# Patient Record
(demographics unavailable — no encounter records)

---

## 2024-11-14 NOTE — PLAN
[FreeTextEntry1] : Routine Gyn Exam: GynHx: fibroid uterus, HPV in 20s w/ subsequent normal Paps BSE taught. Pap smear due 10/25 (last 10/24, reportedly negative) Mammogram and breast sonogram due 2025 (last summer 2024, reportedly negative) Advised pt. to schedule colonoscopy at 46 y/o  Fibroid Uterus: Rx given for kidney sono to ensure fibroid not compressing it D/w pt R/B medical management w/ OCP vs. Lysteda to reduce bleeding. Pt opts for Lysteda. Rx given for Lysteda, 2 tabs 3 times a day x 5days. If no response in a month or two, will switch to OCPs.  Reviewed surgical options to include lap myomectomy vs. hyst b/l salpingectomy. Pt will consider these surgical options in the meantime. Minimally invasive specialist referral given for consult. Pt will likely move to hysterectomy and BS- referred to dr sifuentes  RTO in 1 year or PRN.

## 2024-11-14 NOTE — SIGNATURES
[TextEntry] : This note was authored by Glo London working as a scribe for Dr. Geraldine Parekh.   I, Dr. Geraldine Parekh, have reviewed the content of this note and confirm it is true and accurate. I personally performed the history and physical examination and made all the decisions. 11/14/2024

## 2024-11-14 NOTE — HISTORY OF PRESENT ILLNESS
[Patient reported PAP Smear was normal] : Patient reported PAP Smear was normal [FreeTextEntry1] : 2024. [facility] 44-year-old female  LMP 10/25/24. She presents to establish GYN care, and evaluate large fibroid  Patient w/ known fibroid uterus. She reports heavy regular menses x max 6d and abdominal bloating. LMP was longer than usual, lasting 6 days in duration. Denies associated weakness and dizziness. Menstrual cycles are typically 28 days. As per pt, screening laboratories drawn in May showed no evidence of anemia.   Pt's  w/ vasectomy.   She denies abdominal and pelvic pain. No abnormal vaginal discharge or vaginitis symptoms. She reports normal urination, no dysuria, no incontinence of urine. BM is normal per patient, no blood in stool or constipation/diarrhea.  Pt's children are now 6, 8, and 10 y/o.  PHQ9: 2.  GynHx: fibroid uterus, HPV in 20s w/ subsequent normal Paps. No hx of STI, ovarian cyst, endometriosis, breast issues.  MedHx: denies Meds: denies OBHx:  SHx: benign skin bx No FHx of breast, ovarian, uterine, or colon cancer. All: NKDA SocHx: no toxic habits [TextBox_4] : Pelvic MRI 10/23: uterus 16cm, lateral intramural fibroid L fundal w/ submucosal and subserosal component measuring 11cm Pelvic sono 10/12/24: uterus 17cm, dominant 11.4 cm L sided, intramural and subserosal fibroid [PapSmeardate] : 10/24

## 2024-11-14 NOTE — PHYSICAL EXAM
[Chaperone Present] : A chaperone was present in the examining room during all aspects of the physical examination [[id number]] : A chaperone was present during the pelvic exam. [Appropriately responsive] : appropriately responsive [Alert] : alert [No Acute Distress] : no acute distress [No Lymphadenopathy] : no lymphadenopathy [Regular Rate Rhythm] : regular rate rhythm [No Murmurs] : no murmurs [Clear to Auscultation B/L] : clear to auscultation bilaterally [Soft] : soft [Non-tender] : non-tender [Non-distended] : non-distended [No HSM] : No HSM [No Lesions] : no lesions [No Mass] : no mass [Oriented x3] : oriented x3 [Examination Of The Breasts] : a normal appearance [No Masses] : no breast masses were palpable [Labia Majora] : normal [Labia Minora] : normal [Normal] : normal [Enlarged ___ wks] : enlarged [unfilled] ~Uweeks [Uterine Adnexae] : normal [FreeTextEntry2] : Glo [place] [FreeTextEntry6] : 22-wk size uterus. mobile [FreeTextEntry9] : Guaiac negative, no masses

## 2024-12-08 NOTE — REVIEW OF SYSTEMS
[Abdominal Pain] : abdominal pain [Urgency] : urgency [Abn Vaginal bleeding] : abnormal vaginal bleeding [Negative] : Psychiatric

## 2024-12-08 NOTE — PHYSICAL EXAM
[Chaperone Present] : A chaperone was present in the examining room during all aspects of the physical examination [FreeTextEntry2] : Sandra [Appropriately responsive] : appropriately responsive [Alert] : alert [No Acute Distress] : no acute distress [Soft] : soft [Non-tender] : non-tender [FreeTextEntry7] : 14-16 wks mobile

## 2024-12-08 NOTE — HISTORY OF PRESENT ILLNESS
[FreeTextEntry1] : 44-year-old female  LMP 10/25/24. She presents to establish GYN care, and evaluate large fibroid  Patient w/ known fibroid uterus. She reports heavy regular menses x max 6d and abdominal bloating. LMP was longer than usual, lasting 6 days in duration. Denies associated weakness and dizziness. Menstrual cycles are typically 28 days. The patient has noticed a bulge and some hardness in her abdomen, along with changes to her menstrual period over the past year. She mentioned having heavy and regular periods, although she has no medical symptoms related to the periods. She is able to go out and function, however, she needs to change her sanitary products every hour to an hour and a half. 	-	Chief Complaint (CC): Abdominal bulge and changes in menstrual period. 	-	History of Present Illness (HPI): The patient noticed an abdominal bulge and alterations in her menstrual periods over the last year. The periods have become heavier, but remain regular and occur every 28 days. The flow is particularly heavy for four to five days. Despite this, the patient maintains her ability to function, albeit with frequent changing of sanitary products.  Mri with large central fibroid. GynHx: fibroid uterus, HPV in 20s w/ subsequent normal Paps. No hx of STI, ovarian cyst, endometriosis, breast issues. MedHx: denies Meds: denies OBHx:  SHx: benign skin bx No FHx of breast, ovarian, uterine, or colon cancer. All: NKDA SocHx: no toxic habits  	-

## 2024-12-08 NOTE — PLAN
[FreeTextEntry1] : Discussed the option of continued conservative observation of fibroids vs surgical removal. Treatment options of myomectomy, hysterectomy, ufe and continued observation were also outlined. A detailed discussion regarding the option of myomectomy vs hysterectomy was also held and the risks, benefits, and alternatives provided. All questions answered discussed the risks including but not limited to need for laparotomy, blood transfusion , possible postop infection, and injury to to GI or  tract.  Pt to call re scheduling of procedure.  Will need preop bx. During this visit 30 minutes were spent face-to-face with greater than 50% of this time dedicated to counseling.